# Patient Record
Sex: FEMALE | Race: WHITE
[De-identification: names, ages, dates, MRNs, and addresses within clinical notes are randomized per-mention and may not be internally consistent; named-entity substitution may affect disease eponyms.]

---

## 2018-05-16 ENCOUNTER — RESULT REVIEW (OUTPATIENT)
Age: 58
End: 2018-05-16

## 2019-03-27 PROBLEM — Z00.00 ENCOUNTER FOR PREVENTIVE HEALTH EXAMINATION: Status: ACTIVE | Noted: 2019-03-27

## 2019-04-08 ENCOUNTER — RESULT REVIEW (OUTPATIENT)
Age: 59
End: 2019-04-08

## 2019-04-16 ENCOUNTER — FORM ENCOUNTER (OUTPATIENT)
Age: 59
End: 2019-04-16

## 2019-04-17 ENCOUNTER — OUTPATIENT (OUTPATIENT)
Dept: OUTPATIENT SERVICES | Facility: HOSPITAL | Age: 59
LOS: 1 days | Discharge: HOME | End: 2019-04-17
Payer: COMMERCIAL

## 2019-04-17 ENCOUNTER — APPOINTMENT (OUTPATIENT)
Dept: UROLOGY | Facility: CLINIC | Age: 59
End: 2019-04-17
Payer: COMMERCIAL

## 2019-04-17 ENCOUNTER — OTHER (OUTPATIENT)
Age: 59
End: 2019-04-17

## 2019-04-17 VITALS
SYSTOLIC BLOOD PRESSURE: 141 MMHG | DIASTOLIC BLOOD PRESSURE: 86 MMHG | BODY MASS INDEX: 35.79 KG/M2 | HEART RATE: 86 BPM | WEIGHT: 202 LBS | HEIGHT: 63 IN

## 2019-04-17 DIAGNOSIS — Z78.9 OTHER SPECIFIED HEALTH STATUS: ICD-10-CM

## 2019-04-17 DIAGNOSIS — N20.0 CALCULUS OF KIDNEY: ICD-10-CM

## 2019-04-17 DIAGNOSIS — Z82.49 FAMILY HISTORY OF ISCHEMIC HEART DISEASE AND OTHER DISEASES OF THE CIRCULATORY SYSTEM: ICD-10-CM

## 2019-04-17 DIAGNOSIS — Z63.4 DISAPPEARANCE AND DEATH OF FAMILY MEMBER: ICD-10-CM

## 2019-04-17 DIAGNOSIS — F17.200 NICOTINE DEPENDENCE, UNSPECIFIED, UNCOMPLICATED: ICD-10-CM

## 2019-04-17 PROCEDURE — 74019 RADEX ABDOMEN 2 VIEWS: CPT | Mod: 26

## 2019-04-17 PROCEDURE — 99203 OFFICE O/P NEW LOW 30 MIN: CPT

## 2019-04-17 SDOH — SOCIAL STABILITY - SOCIAL INSECURITY: DISSAPEARANCE AND DEATH OF FAMILY MEMBER: Z63.4

## 2019-05-15 ENCOUNTER — APPOINTMENT (OUTPATIENT)
Dept: UROLOGY | Facility: CLINIC | Age: 59
End: 2019-05-15
Payer: COMMERCIAL

## 2019-05-15 VITALS
HEART RATE: 88 BPM | BODY MASS INDEX: 35.44 KG/M2 | SYSTOLIC BLOOD PRESSURE: 120 MMHG | WEIGHT: 200 LBS | HEIGHT: 63 IN | DIASTOLIC BLOOD PRESSURE: 74 MMHG

## 2019-05-15 PROCEDURE — 99215 OFFICE O/P EST HI 40 MIN: CPT

## 2019-05-15 NOTE — PHYSICAL EXAM
[General Appearance - Well Developed] : well developed [General Appearance - Well Nourished] : well nourished [Normal Appearance] : normal appearance [Well Groomed] : well groomed [General Appearance - In No Acute Distress] : no acute distress [Heart Rate And Rhythm] : Heart rate and rhythm were normal [Edema] : no peripheral edema [Respiration, Rhythm And Depth] : normal respiratory rhythm and effort [Exaggerated Use Of Accessory Muscles For Inspiration] : no accessory muscle use [Auscultation Breath Sounds / Voice Sounds] : lungs were clear to auscultation bilaterally [Bowel Sounds] : normal bowel sounds [Abdomen Soft] : soft [Abdomen Tenderness] : non-tender [Costovertebral Angle Tenderness] : no ~M costovertebral angle tenderness [Normal Station and Gait] : the gait and station were normal for the patient's age [] : no rash [No Focal Deficits] : no focal deficits [Oriented To Time, Place, And Person] : oriented to person, place, and time [Affect] : the affect was normal [Mood] : the mood was normal [Not Anxious] : not anxious [FreeTextEntry1] : Obese

## 2019-05-15 NOTE — HISTORY OF PRESENT ILLNESS
[None] : None [FreeTextEntry1] : Radha is a 50-year-old female, with a history of proteinuria, who was sent for consultation regarding bilateral renal stones discovered during the work up for her proteinuria. She has a remote history of renal stones, having passed a stone many years ago. She has never had any further episodes of renal colic and gross hematuria.\par \par She was sent for a renal sonogram, secondary to history of proteinuria, which revealed a questionable 1.1 cm nonobstructing right renal calculus. There were several small left renal cysts noted. CT scan was recommended. CT scan revealed an 8 mm nonobstructing calculus in the lower pole of the right kidney, Hounsfield units 800. There is a 3 mm nonobstructing calculus in the lower pole of the left kidney. There are some punctate calculus in the left kidney as well. No evidence of hydronephrosis or hydroureter bilaterally. There is some nonspecific diffuse thickening of the left adrenal gland without a discrete nodule, may reflect adrenal hyperplasia.\par

## 2019-05-15 NOTE — ASSESSMENT
[FreeTextEntry1] : She has numerous metabolic issues the main saving leonela is the volume but I think she’s going to need a nephrologist to fine-tune this.\par \par Please note she ready saw Dr. Suarez who initially told her to come back in six months. With this metabolic workup, a copy which will be given to her he may want to see her sooner and begin to make changes. in the meantime we will set her up for right lower pole shockwave lithotripsy realizing that even if we break it up the stone fragments may not pass completely because it is lower pole.\par \par In the end she will follow up with [facility] we prevention, schedule right lower pole shockwave lithotripsy. She is aware she has to be off all aspirin or aspirin like compounds for seven days prior to the procedure.\par

## 2019-05-15 NOTE — LETTER HEADER
[FreeTextEntry3] : Delmer Jennings M.D.\par Director of Urology\par [facility]\par [address] [place] [address], [address]\[place] [address]

## 2019-05-15 NOTE — HISTORY OF PRESENT ILLNESS
[FreeTextEntry1] : Radha had been seen in April 17 she had an eight or 9 mm right lower pole stones and if you punctate stones on the left side. We sent her for an x-ray to see if the stone is amenable to shockwave lithotripsy even though it is in the lower pole it is small enough that we might be able to take care of it and a metabolic workup to see why she’s forming the stones that she has formed many over time. She did the testing and comes in for review. Please note she will eventually need a nephrology consult as a the experts in prevention. [None] : no symptoms

## 2019-05-15 NOTE — LETTER BODY
[Dear  ___] : Dear  [unfilled], [Please see my note below.] : Please see my note below. [Consult Letter:] : I had the pleasure of evaluating your patient, [unfilled]. [Consult Closing:] : Thank you very much for allowing me to participate in the care of this patient.  If you have any questions, please do not hesitate to contact me. [Sincerely,] : Sincerely, [DrJudit  ___] : Dr. MO [FreeTextEntry2] : Dr. Anibal Peñaloza\par [address] [facility]\[place] [address]

## 2019-05-15 NOTE — ADDENDUM
[FreeTextEntry1] : DELMER JENNINGS M.D.\par [address] [place] [address], [address]\par [place], [place] [address]\par [phone]\par FAX#: [phone]\par \par May 15, 2019\par \par Dear ANALIPALMA	: 1960\par \par You have been scheduled for EXTRA CORPOREAL SHOCK WAVE LITHOTRIPSY of a RIGHT renal stone on Tuesday May / Yovana _2019.  If all things go as expected you will leave the hospital later that same day.  For the week prior to that period please do not take any aspirin or any blood thinning type drug.  If you are taking such drugs please tell us before you are admitted.  The procedure does not involve an incision, & you will be allowed to bathe or shower ad rahul. After the procedure please strain your urine (a strainer will be provided) and bring the fragments with you when I next see you (about two weeks after the procedure). Please ask my staff to arrange for a KUB on the day you are to see me.\par You will be given a prescription for pain medication.  If you wish to be able to fill it before you come into the hospital, please let us know so that we may give you the prescription in advance.\par If you have a heart murmur, please let us know so that we can give you an appropriate antibiotic to prevent any infection.  You should not eat or drink anything from midnight the night before, except for any heart or related medications you are taking.  If you are not sure if you should take the medication discuss that with the doctor in advance.\par If medical clearance is necessary please contact your primary care physician to arrange for that at least several days before your scheduled admission date.\par You will be called by the hospital several days prior to your admission in order to have pre-operative tests done which may include; CXR, EKG, URINE AND OR BLOODWORK.  If you have not been called in by 48 hours before your scheduled date of the procedure (______________) please call our office and let us know.\par \par 		If there are any questions, please feel free to discuss this with us at any time, you can reach us at the phone numbers listed above. \par \par Sincerely,\par \par Delmer Jennings MD\par \par Delmer Jennings MD\par Director of the Division of Urology, in the Department of Surgery\par [place], [place]\par \par PS: IF you have any ALLERGIES or any special requirements, such as special prescription blanks or you need the prescriptions in advance, please let me know.\par

## 2019-05-15 NOTE — ASSESSMENT
[FreeTextEntry1] : I reviewed the CAT scan with her. The right side has an 8 mm lower pole stone and should show on regular x-ray. The left side has a 3 mm lower pole and two other punctate ones in the upper region but there too small to buddy ends small enough that if they do form to the ureter they should pass. However they are big enough that they can continue to grow this will have to be watched.\par \par We’ll going to get a metabolic workup as I want her optimizes for his future stone formation and before we consider treating. If she is prone to stones and we break the 8 mm stones and to 4 to 10 3 to 4 mm stone she’ll end up with a lot of bigger stones. She is seen Dr. Suarez so will ask him to get involved from that aspect as well.\par

## 2019-05-15 NOTE — LETTER HEADER
[FreeTextEntry3] : Delmer Jennings M.D.\par Director of Urology\par [facility]\par [address] [place] [address], [address]\[place] [address]

## 2019-05-15 NOTE — PHYSICAL EXAM
[General Appearance - Well Developed] : well developed [Normal Appearance] : normal appearance [General Appearance - Well Nourished] : well nourished [Well Groomed] : well groomed [General Appearance - In No Acute Distress] : no acute distress [] : no respiratory distress [Respiration, Rhythm And Depth] : normal respiratory rhythm and effort [Exaggerated Use Of Accessory Muscles For Inspiration] : no accessory muscle use [Oriented To Time, Place, And Person] : oriented to person, place, and time [Affect] : the affect was normal [Mood] : the mood was normal [Not Anxious] : not anxious [Normal Station and Gait] : the gait and station were normal for the patient's age

## 2019-05-21 ENCOUNTER — FORM ENCOUNTER (OUTPATIENT)
Age: 59
End: 2019-05-21

## 2019-05-22 ENCOUNTER — OUTPATIENT (OUTPATIENT)
Dept: OUTPATIENT SERVICES | Facility: HOSPITAL | Age: 59
LOS: 1 days | Discharge: HOME | End: 2019-05-22
Payer: COMMERCIAL

## 2019-05-22 VITALS
HEIGHT: 63 IN | DIASTOLIC BLOOD PRESSURE: 85 MMHG | TEMPERATURE: 97 F | RESPIRATION RATE: 18 BRPM | SYSTOLIC BLOOD PRESSURE: 138 MMHG | WEIGHT: 199.96 LBS | OXYGEN SATURATION: 96 % | HEART RATE: 98 BPM

## 2019-05-22 DIAGNOSIS — Z98.890 OTHER SPECIFIED POSTPROCEDURAL STATES: Chronic | ICD-10-CM

## 2019-05-22 DIAGNOSIS — N20.2 CALCULUS OF KIDNEY WITH CALCULUS OF URETER: ICD-10-CM

## 2019-05-22 DIAGNOSIS — Z01.818 ENCOUNTER FOR OTHER PREPROCEDURAL EXAMINATION: ICD-10-CM

## 2019-05-22 LAB
ALBUMIN SERPL ELPH-MCNC: 4.7 G/DL — SIGNIFICANT CHANGE UP (ref 3.5–5.2)
ALP SERPL-CCNC: 100 U/L — SIGNIFICANT CHANGE UP (ref 30–115)
ALT FLD-CCNC: 27 U/L — SIGNIFICANT CHANGE UP (ref 0–41)
ANION GAP SERPL CALC-SCNC: 14 MMOL/L — SIGNIFICANT CHANGE UP (ref 7–14)
APPEARANCE UR: CLEAR — SIGNIFICANT CHANGE UP
APTT BLD: 33.1 SEC — SIGNIFICANT CHANGE UP (ref 27–39.2)
AST SERPL-CCNC: 22 U/L — SIGNIFICANT CHANGE UP (ref 0–41)
BACTERIA # UR AUTO: ABNORMAL /HPF
BASOPHILS # BLD AUTO: 0.06 K/UL — SIGNIFICANT CHANGE UP (ref 0–0.2)
BASOPHILS NFR BLD AUTO: 0.7 % — SIGNIFICANT CHANGE UP (ref 0–1)
BILIRUB SERPL-MCNC: 0.3 MG/DL — SIGNIFICANT CHANGE UP (ref 0.2–1.2)
BILIRUB UR-MCNC: NEGATIVE — SIGNIFICANT CHANGE UP
BUN SERPL-MCNC: 16 MG/DL — SIGNIFICANT CHANGE UP (ref 10–20)
CALCIUM SERPL-MCNC: 9.6 MG/DL — SIGNIFICANT CHANGE UP (ref 8.5–10.1)
CHLORIDE SERPL-SCNC: 107 MMOL/L — SIGNIFICANT CHANGE UP (ref 98–110)
CO2 SERPL-SCNC: 23 MMOL/L — SIGNIFICANT CHANGE UP (ref 17–32)
COLOR SPEC: YELLOW — SIGNIFICANT CHANGE UP
CREAT SERPL-MCNC: 1 MG/DL — SIGNIFICANT CHANGE UP (ref 0.7–1.5)
DIFF PNL FLD: NEGATIVE — SIGNIFICANT CHANGE UP
EOSINOPHIL # BLD AUTO: 0.19 K/UL — SIGNIFICANT CHANGE UP (ref 0–0.7)
EOSINOPHIL NFR BLD AUTO: 2.2 % — SIGNIFICANT CHANGE UP (ref 0–8)
GLUCOSE SERPL-MCNC: 97 MG/DL — SIGNIFICANT CHANGE UP (ref 70–99)
GLUCOSE UR QL: NEGATIVE MG/DL — SIGNIFICANT CHANGE UP
HCT VFR BLD CALC: 42 % — SIGNIFICANT CHANGE UP (ref 37–47)
HGB BLD-MCNC: 14.4 G/DL — SIGNIFICANT CHANGE UP (ref 12–16)
IMM GRANULOCYTES NFR BLD AUTO: 0.3 % — SIGNIFICANT CHANGE UP (ref 0.1–0.3)
INR BLD: 0.97 RATIO — SIGNIFICANT CHANGE UP (ref 0.65–1.3)
KETONES UR-MCNC: NEGATIVE — SIGNIFICANT CHANGE UP
LEUKOCYTE ESTERASE UR-ACNC: NEGATIVE — SIGNIFICANT CHANGE UP
LYMPHOCYTES # BLD AUTO: 1.86 K/UL — SIGNIFICANT CHANGE UP (ref 1.2–3.4)
LYMPHOCYTES # BLD AUTO: 21.3 % — SIGNIFICANT CHANGE UP (ref 20.5–51.1)
MCHC RBC-ENTMCNC: 28.3 PG — SIGNIFICANT CHANGE UP (ref 27–31)
MCHC RBC-ENTMCNC: 34.3 G/DL — SIGNIFICANT CHANGE UP (ref 32–37)
MCV RBC AUTO: 82.5 FL — SIGNIFICANT CHANGE UP (ref 81–99)
MONOCYTES # BLD AUTO: 0.76 K/UL — HIGH (ref 0.1–0.6)
MONOCYTES NFR BLD AUTO: 8.7 % — SIGNIFICANT CHANGE UP (ref 1.7–9.3)
NEUTROPHILS # BLD AUTO: 5.84 K/UL — SIGNIFICANT CHANGE UP (ref 1.4–6.5)
NEUTROPHILS NFR BLD AUTO: 66.8 % — SIGNIFICANT CHANGE UP (ref 42.2–75.2)
NITRITE UR-MCNC: NEGATIVE — SIGNIFICANT CHANGE UP
NRBC # BLD: 0 /100 WBCS — SIGNIFICANT CHANGE UP (ref 0–0)
PH UR: 6 — SIGNIFICANT CHANGE UP (ref 5–8)
PLATELET # BLD AUTO: 226 K/UL — SIGNIFICANT CHANGE UP (ref 130–400)
POTASSIUM SERPL-MCNC: 3.9 MMOL/L — SIGNIFICANT CHANGE UP (ref 3.5–5)
POTASSIUM SERPL-SCNC: 3.9 MMOL/L — SIGNIFICANT CHANGE UP (ref 3.5–5)
PROT SERPL-MCNC: 7.6 G/DL — SIGNIFICANT CHANGE UP (ref 6–8)
PROT UR-MCNC: 30 MG/DL
PROTHROM AB SERPL-ACNC: 11.2 SEC — SIGNIFICANT CHANGE UP (ref 9.95–12.87)
RBC # BLD: 5.09 M/UL — SIGNIFICANT CHANGE UP (ref 4.2–5.4)
RBC # FLD: 13.2 % — SIGNIFICANT CHANGE UP (ref 11.5–14.5)
SODIUM SERPL-SCNC: 144 MMOL/L — SIGNIFICANT CHANGE UP (ref 135–146)
SP GR SPEC: 1.01 — SIGNIFICANT CHANGE UP (ref 1.01–1.03)
TRI-PHOS CRY UR QL COMP ASSIST: ABNORMAL /HPF
UROBILINOGEN FLD QL: 0.2 MG/DL — SIGNIFICANT CHANGE UP (ref 0.2–0.2)
WBC # BLD: 8.74 K/UL — SIGNIFICANT CHANGE UP (ref 4.8–10.8)
WBC # FLD AUTO: 8.74 K/UL — SIGNIFICANT CHANGE UP (ref 4.8–10.8)
WBC UR QL: SIGNIFICANT CHANGE UP /HPF

## 2019-05-22 PROCEDURE — 93010 ELECTROCARDIOGRAM REPORT: CPT

## 2019-05-22 PROCEDURE — 71046 X-RAY EXAM CHEST 2 VIEWS: CPT | Mod: 26

## 2019-05-22 NOTE — H&P PST ADULT - NSICDXPASTSURGICALHX_GEN_ALL_CORE_FT
PAST SURGICAL HISTORY:  History of surgery abdominal lipomna removal    S/P colonoscopy with polypectomy

## 2019-05-23 LAB
CULTURE RESULTS: SIGNIFICANT CHANGE UP
SPECIMEN SOURCE: SIGNIFICANT CHANGE UP

## 2019-05-24 ENCOUNTER — APPOINTMENT (OUTPATIENT)
Dept: OBGYN | Facility: CLINIC | Age: 59
End: 2019-05-24

## 2019-06-04 ENCOUNTER — APPOINTMENT (OUTPATIENT)
Dept: UROLOGY | Facility: HOSPITAL | Age: 59
End: 2019-06-04
Payer: COMMERCIAL

## 2019-06-04 ENCOUNTER — OUTPATIENT (OUTPATIENT)
Dept: OUTPATIENT SERVICES | Facility: HOSPITAL | Age: 59
LOS: 1 days | Discharge: HOME | End: 2019-06-04

## 2019-06-04 VITALS
HEART RATE: 80 BPM | HEIGHT: 63 IN | DIASTOLIC BLOOD PRESSURE: 80 MMHG | SYSTOLIC BLOOD PRESSURE: 130 MMHG | RESPIRATION RATE: 18 BRPM | WEIGHT: 199.96 LBS | OXYGEN SATURATION: 97 % | TEMPERATURE: 97 F

## 2019-06-04 VITALS — RESPIRATION RATE: 18 BRPM | DIASTOLIC BLOOD PRESSURE: 66 MMHG | SYSTOLIC BLOOD PRESSURE: 121 MMHG | HEART RATE: 72 BPM

## 2019-06-04 DIAGNOSIS — Z98.890 OTHER SPECIFIED POSTPROCEDURAL STATES: Chronic | ICD-10-CM

## 2019-06-04 PROCEDURE — 50590 FRAGMENTING OF KIDNEY STONE: CPT | Mod: RT

## 2019-06-04 RX ORDER — SODIUM CHLORIDE 9 MG/ML
1000 INJECTION, SOLUTION INTRAVENOUS
Refills: 0 | Status: DISCONTINUED | OUTPATIENT
Start: 2019-06-04 | End: 2019-06-04

## 2019-06-04 RX ORDER — ONDANSETRON 8 MG/1
4 TABLET, FILM COATED ORAL ONCE
Refills: 0 | Status: DISCONTINUED | OUTPATIENT
Start: 2019-06-04 | End: 2019-06-04

## 2019-06-04 RX ORDER — HYDROMORPHONE HYDROCHLORIDE 2 MG/ML
0.5 INJECTION INTRAMUSCULAR; INTRAVENOUS; SUBCUTANEOUS ONCE
Refills: 0 | Status: DISCONTINUED | OUTPATIENT
Start: 2019-06-04 | End: 2019-06-04

## 2019-06-04 RX ORDER — OXYCODONE AND ACETAMINOPHEN 5; 325 MG/1; MG/1
1 TABLET ORAL ONCE
Refills: 0 | Status: DISCONTINUED | OUTPATIENT
Start: 2019-06-04 | End: 2019-06-04

## 2019-06-04 RX ADMIN — SODIUM CHLORIDE 100 MILLILITER(S): 9 INJECTION, SOLUTION INTRAVENOUS at 12:09

## 2019-06-04 NOTE — BRIEF OPERATIVE NOTE - NSICDXBRIEFPROCEDURE_GEN_ALL_CORE_FT
PROCEDURES:  Extracorporeal shockwave lithotripsy of kidney 04-Jun-2019 10:29:41 right Delmer Jennings

## 2019-06-04 NOTE — ASU DISCHARGE PLAN (ADULT/PEDIATRIC) - CALL YOUR DOCTOR IF YOU HAVE ANY OF THE FOLLOWING:
Inability to tolerate liquids or foods/Swelling that gets worse/Increased irritability or sluggishness/Numbness, tingling, color or temperature change to extremity/Bleeding that does not stop/Pain not relieved by Medications/Nausea and vomiting that does not stop/Unable to urinate/Fever greater than (need to indicate Fahrenheit or Celsius)

## 2019-06-04 NOTE — CHART NOTE - NSCHARTNOTEFT_GEN_A_CORE
PACU ANESTHESIA ADMISSION NOTE      Procedure: Extracorporeal shockwave lithotripsy of kidney: right    Post op diagnosis:  Right nephrolithiasis      ____  Intubated  TV:______       Rate: ______      FiO2: ______    _x___  Patent Airway    _x___  Full return of protective reflexes    __x__  Full recovery from anesthesia / back to baseline status    Vitals:  T(C): 36.2 (06-04-19 @ 09:06), Max: 36.2 (06-04-19 @ 08:06)  HR: 80 (06-04-19 @ 09:06) (80 - 80)  BP: 130/80 (06-04-19 @ 09:06) (130/80 - 130/80)  RR: 18 (06-04-19 @ 09:06) (18 - 18)  SpO2: 97% (06-04-19 @ 09:06) (97% - 97%)    Mental Status:  __x__ Awake   __x___ Alert   _____ Drowsy   _____ Sedated    Nausea/Vomiting:  ____ NO  ___x___Yes,   See Post - Op Orders          Pain Scale (0-10):  _____    Treatment: ____ None    _x___ See Post - Op/PCA Orders    Post - Operative Fluids:   ____ Oral   _x___ See Post - Op Orders    Plan: Discharge:   _x___Home       _____Floor     _____Critical Care    _____  Other:_________________    Comments: uneventful anesthesia course no complications. VItals stable. Pt transferred to PACU

## 2019-06-04 NOTE — ASU DISCHARGE PLAN (ADULT/PEDIATRIC) - PAIN MANAGEMENT
Prescriptions electronically submitted to pharmacy from doctor's office/tramadol Prescriptions electronically submitted to pharmacy from doctor's office/tramadol & flomax

## 2019-06-04 NOTE — BRIEF OPERATIVE NOTE - NSICDXBRIEFPREOP_GEN_ALL_CORE_FT
PRE-OP DIAGNOSIS:  Right kidney stone 04-Jun-2019 10:30:06  Dlemer Jennings PRE-OP DIAGNOSIS:  Right kidney stone 04-Jun-2019 10:30:06  Delmer Jennings

## 2019-06-04 NOTE — ASU DISCHARGE PLAN (ADULT/PEDIATRIC) - CARE PROVIDER_API CALL
Delmer Jennings)  Urology  [address] [place] [address], [address]  [place] [address]  Phone: [phone]  Fax: [phone]  Follow Up Time:

## 2019-06-04 NOTE — ASU PREOP CHECKLIST - TO WHOM
opportunity for questions and answersrendered Ariella SHELBY opportunity for questions and answersrendered

## 2019-06-07 DIAGNOSIS — Z87.442 PERSONAL HISTORY OF URINARY CALCULI: ICD-10-CM

## 2019-06-07 DIAGNOSIS — M77.9 ENTHESOPATHY, UNSPECIFIED: ICD-10-CM

## 2019-06-07 DIAGNOSIS — F17.200 NICOTINE DEPENDENCE, UNSPECIFIED, UNCOMPLICATED: ICD-10-CM

## 2019-06-07 DIAGNOSIS — N20.0 CALCULUS OF KIDNEY: ICD-10-CM

## 2019-06-07 PROBLEM — M77.30 CALCANEAL SPUR, UNSPECIFIED FOOT: Chronic | Status: ACTIVE | Noted: 2019-05-22

## 2019-06-07 PROBLEM — Z98.890 OTHER SPECIFIED POSTPROCEDURAL STATES: Chronic | Status: ACTIVE | Noted: 2019-05-22

## 2019-06-14 ENCOUNTER — FORM ENCOUNTER (OUTPATIENT)
Age: 59
End: 2019-06-14

## 2019-06-15 ENCOUNTER — OUTPATIENT (OUTPATIENT)
Dept: OUTPATIENT SERVICES | Facility: HOSPITAL | Age: 59
LOS: 1 days | Discharge: HOME | End: 2019-06-15
Payer: MEDICARE

## 2019-06-15 DIAGNOSIS — Z98.890 OTHER SPECIFIED POSTPROCEDURAL STATES: Chronic | ICD-10-CM

## 2019-06-15 DIAGNOSIS — N20.0 CALCULUS OF KIDNEY: ICD-10-CM

## 2019-06-15 PROCEDURE — 74019 RADEX ABDOMEN 2 VIEWS: CPT | Mod: 26

## 2019-06-19 ENCOUNTER — RESULT REVIEW (OUTPATIENT)
Age: 59
End: 2019-06-19

## 2019-07-01 ENCOUNTER — OUTPATIENT (OUTPATIENT)
Dept: OUTPATIENT SERVICES | Facility: HOSPITAL | Age: 59
LOS: 1 days | Discharge: HOME | End: 2019-07-01

## 2019-07-01 ENCOUNTER — APPOINTMENT (OUTPATIENT)
Dept: UROLOGY | Facility: CLINIC | Age: 59
End: 2019-07-01
Payer: COMMERCIAL

## 2019-07-01 VITALS
BODY MASS INDEX: 35.44 KG/M2 | DIASTOLIC BLOOD PRESSURE: 85 MMHG | HEIGHT: 63 IN | SYSTOLIC BLOOD PRESSURE: 120 MMHG | WEIGHT: 200 LBS | HEART RATE: 91 BPM

## 2019-07-01 DIAGNOSIS — Z98.890 OTHER SPECIFIED POSTPROCEDURAL STATES: Chronic | ICD-10-CM

## 2019-07-01 DIAGNOSIS — N20.0 CALCULUS OF KIDNEY: ICD-10-CM

## 2019-07-01 PROCEDURE — 99024 POSTOP FOLLOW-UP VISIT: CPT

## 2019-07-01 RX ORDER — TRAMADOL HYDROCHLORIDE 50 MG/1
50 TABLET, COATED ORAL 3 TIMES DAILY
Qty: 12 | Refills: 0 | Status: COMPLETED | COMMUNITY
Start: 2019-06-04 | End: 2019-07-01

## 2019-07-01 RX ORDER — IBUPROFEN 600 MG/1
600 TABLET, FILM COATED ORAL
Qty: 30 | Refills: 0 | Status: DISCONTINUED | COMMUNITY
Start: 2019-05-11

## 2019-07-01 RX ORDER — TAMSULOSIN HYDROCHLORIDE 0.4 MG/1
0.4 CAPSULE ORAL
Qty: 30 | Refills: 0 | Status: COMPLETED | COMMUNITY
Start: 2019-06-04 | End: 2019-07-01

## 2019-07-01 RX ORDER — POLYETHYLENE GLYCOL-3350 AND ELECTROLYTES 236; 6.74; 5.86; 2.97; 22.74 G/274.31G; G/274.31G; G/274.31G; G/274.31G; G/274.31G
236 POWDER, FOR SOLUTION ORAL
Qty: 4000 | Refills: 0 | Status: DISCONTINUED | COMMUNITY
Start: 2019-04-12

## 2019-07-01 RX ORDER — OMEGA-3-ACID ETHYL ESTERS CAPSULES 1 G/1
1 CAPSULE, LIQUID FILLED ORAL
Qty: 120 | Refills: 0 | Status: DISCONTINUED | COMMUNITY
Start: 2019-05-11

## 2019-07-01 RX ORDER — FACIAL-BODY WIPES
5 EACH TOPICAL
Qty: 4 | Refills: 0 | Status: DISCONTINUED | COMMUNITY
Start: 2019-04-12

## 2019-07-01 NOTE — LETTER BODY
[Dear  ___] : Dear  [unfilled], [Courtesy Letter:] : I had the pleasure of seeing your patient, [unfilled], in my office today. [Please see my note below.] : Please see my note below. [Sincerely,] : Sincerely, [DrJudit  ___] : Dr. MO [FreeTextEntry2] : Dr. Anibal Peñaloza\par [address] [facility]\[place] [address]

## 2019-07-01 NOTE — HISTORY OF PRESENT ILLNESS
[None] : None [FreeTextEntry1] : Radha is a 50-year-old female who we've been following for nephrolithiasis. She status post ESWL of a 9 mm right lower pole stone. There is punctate stone on her left side. She is currently follow up with nephrology and will continue to do so regarding metabolic workup. She presents to review her KUB x-ray post procedure. She has passed multiple fragments and these will be sent for analysis. Statement Selected

## 2019-07-01 NOTE — ASSESSMENT
[FreeTextEntry1] : KUB x-ray reveals resolution of 9 mm right renal stone. Patients stone will be sent for analysis. She continue follow up with nephrology regarding metabolic workup and stone prevention.\par \par She is aware that the KUB did not show the tiny stone on the left and she could has small fragments on the right that will be seen. Hopefully the ultrasound will be more definitive. She also knows she needs to follow up with nephrology in the hopes of preventing further stones\par \par She will follow up in September with renal sonogram/KUB x-ray for review.

## 2019-07-08 LAB — COMPN STONE: NORMAL

## 2019-07-16 ENCOUNTER — RESULT REVIEW (OUTPATIENT)
Age: 59
End: 2019-07-16

## 2019-09-27 ENCOUNTER — FORM ENCOUNTER (OUTPATIENT)
Age: 59
End: 2019-09-27

## 2019-09-28 ENCOUNTER — OUTPATIENT (OUTPATIENT)
Dept: OUTPATIENT SERVICES | Facility: HOSPITAL | Age: 59
LOS: 1 days | Discharge: HOME | End: 2019-09-28
Payer: MEDICARE

## 2019-09-28 DIAGNOSIS — Z98.890 OTHER SPECIFIED POSTPROCEDURAL STATES: Chronic | ICD-10-CM

## 2019-09-28 DIAGNOSIS — N20.0 CALCULUS OF KIDNEY: ICD-10-CM

## 2019-09-28 PROCEDURE — 74019 RADEX ABDOMEN 2 VIEWS: CPT | Mod: 26

## 2019-10-02 ENCOUNTER — APPOINTMENT (OUTPATIENT)
Dept: UROLOGY | Facility: CLINIC | Age: 59
End: 2019-10-02
Payer: COMMERCIAL

## 2019-10-02 VITALS
HEIGHT: 63 IN | HEART RATE: 83 BPM | BODY MASS INDEX: 35.44 KG/M2 | WEIGHT: 200 LBS | DIASTOLIC BLOOD PRESSURE: 78 MMHG | SYSTOLIC BLOOD PRESSURE: 119 MMHG

## 2019-10-02 PROCEDURE — 99213 OFFICE O/P EST LOW 20 MIN: CPT

## 2019-10-02 NOTE — LETTER BODY
[Dear  ___] : Dear ~TAYA, [Courtesy Letter:] : I had the pleasure of seeing your patient, [unfilled], in my office today. [Sincerely,] : Sincerely, [Please see my note below.] : Please see my note below. [DrJudit  ___] : Dr. MO [FreeTextEntry2] : Philip Cox NP\par [address] \[place] [address] \par

## 2019-10-02 NOTE — LETTER HEADER
[FreeTextEntry3] : Delmer Jennings M.D.\par Director of Urology\par [facility]\par [address] [place] [address], [address]\[place] [address]

## 2019-10-02 NOTE — HISTORY OF PRESENT ILLNESS
[None] : no symptoms [FreeTextEntry1] : Radha is a 58-year-old female who underwent right Shockwave lithotripsy June 4 for a  lower pole stone. Follow-up studies have shown a persistent small stone on the left lower pole and a small remnant in the right lower pole. She had a recent x-ray done on September 28, 2019 and is here for review. Other than occasional twinge in her right groin which is probably not related to the kidney stones she is feeling fine. She is supposed to see Dr. Suarez soon but he was on vacation and her appointment was rescheduled

## 2019-10-02 NOTE — PHYSICAL EXAM
[General Appearance - Well Developed] : well developed [General Appearance - Well Nourished] : well nourished [Normal Appearance] : normal appearance [Well Groomed] : well groomed [General Appearance - In No Acute Distress] : no acute distress [Abdomen Soft] : soft [Abdomen Tenderness] : non-tender [Costovertebral Angle Tenderness] : no ~M costovertebral angle tenderness [Edema] : no peripheral edema [Respiration, Rhythm And Depth] : normal respiratory rhythm and effort [] : no respiratory distress [Oriented To Time, Place, And Person] : oriented to person, place, and time [Exaggerated Use Of Accessory Muscles For Inspiration] : no accessory muscle use [Mood] : the mood was normal [Affect] : the affect was normal [Not Anxious] : not anxious [Normal Station and Gait] : the gait and station were normal for the patient's age [FreeTextEntry1] : morbid obesity

## 2019-10-02 NOTE — ASSESSMENT
[FreeTextEntry1] : Physical exam other than her weight shows no major change there is no CVA no abdominal tenderness. I reviewed the x-rays with her and to my review the lower pole stones having changed and I don’t see anything close to 7 mm on the left lower pole and I will submitted to senior staff for review.\par \par From my viewpoint she is stable she will follow with Dr. Suarez she will get an ultrasound in six months and see me then.\par

## 2019-12-28 ENCOUNTER — OUTPATIENT (OUTPATIENT)
Dept: OUTPATIENT SERVICES | Facility: HOSPITAL | Age: 59
LOS: 1 days | Discharge: HOME | End: 2019-12-28

## 2019-12-28 DIAGNOSIS — Z98.890 OTHER SPECIFIED POSTPROCEDURAL STATES: Chronic | ICD-10-CM

## 2019-12-28 DIAGNOSIS — E78.6 LIPOPROTEIN DEFICIENCY: ICD-10-CM

## 2019-12-28 DIAGNOSIS — F41.9 ANXIETY DISORDER, UNSPECIFIED: ICD-10-CM

## 2019-12-28 DIAGNOSIS — E78.5 HYPERLIPIDEMIA, UNSPECIFIED: ICD-10-CM

## 2019-12-28 DIAGNOSIS — N20.0 CALCULUS OF KIDNEY: ICD-10-CM

## 2019-12-28 DIAGNOSIS — E83.52 HYPERCALCEMIA: ICD-10-CM

## 2019-12-28 DIAGNOSIS — R79.89 OTHER SPECIFIED ABNORMAL FINDINGS OF BLOOD CHEMISTRY: ICD-10-CM

## 2019-12-28 DIAGNOSIS — R80.9 PROTEINURIA, UNSPECIFIED: ICD-10-CM

## 2019-12-28 DIAGNOSIS — E66.9 OBESITY, UNSPECIFIED: ICD-10-CM

## 2019-12-28 DIAGNOSIS — E55.9 VITAMIN D DEFICIENCY, UNSPECIFIED: ICD-10-CM

## 2019-12-28 DIAGNOSIS — F32.9 MAJOR DEPRESSIVE DISORDER, SINGLE EPISODE, UNSPECIFIED: ICD-10-CM

## 2019-12-28 DIAGNOSIS — N60.09 SOLITARY CYST OF UNSPECIFIED BREAST: ICD-10-CM

## 2019-12-28 DIAGNOSIS — E21.0 PRIMARY HYPERPARATHYROIDISM: ICD-10-CM

## 2020-03-03 ENCOUNTER — RESULT REVIEW (OUTPATIENT)
Age: 60
End: 2020-03-03

## 2020-03-06 NOTE — PRE-ANESTHESIA EVALUATION ADULT - WEIGHT IN KG
Dr. Afshan Soliman had a long talk with patient and  regarding how to progress with care. They are considering hospice, but not ready at this point. Fentanyl patch increased and dilaudid dose increased. Pt up to chair for several hours today. 90.7

## 2020-03-21 ENCOUNTER — OUTPATIENT (OUTPATIENT)
Dept: OUTPATIENT SERVICES | Facility: HOSPITAL | Age: 60
LOS: 1 days | Discharge: HOME | End: 2020-03-21
Payer: MEDICARE

## 2020-03-21 ENCOUNTER — RESULT REVIEW (OUTPATIENT)
Age: 60
End: 2020-03-21

## 2020-03-21 DIAGNOSIS — Z98.890 OTHER SPECIFIED POSTPROCEDURAL STATES: Chronic | ICD-10-CM

## 2020-03-21 DIAGNOSIS — N20.0 CALCULUS OF KIDNEY: ICD-10-CM

## 2020-03-21 PROCEDURE — 76775 US EXAM ABDO BACK WALL LIM: CPT | Mod: 26

## 2020-06-04 ENCOUNTER — APPOINTMENT (OUTPATIENT)
Dept: UROLOGY | Facility: CLINIC | Age: 60
End: 2020-06-04
Payer: COMMERCIAL

## 2020-06-04 VITALS
BODY MASS INDEX: 35.44 KG/M2 | TEMPERATURE: 97.8 F | SYSTOLIC BLOOD PRESSURE: 126 MMHG | DIASTOLIC BLOOD PRESSURE: 80 MMHG | HEART RATE: 83 BPM | HEIGHT: 63 IN | WEIGHT: 200 LBS

## 2020-06-04 DIAGNOSIS — N20.0 CALCULUS OF KIDNEY: ICD-10-CM

## 2020-06-04 PROCEDURE — 99214 OFFICE O/P EST MOD 30 MIN: CPT

## 2020-06-04 NOTE — LETTER BODY
[Dear  ___] : Dear ~TAYA, [Please see my note below.] : Please see my note below. [Courtesy Letter:] : I had the pleasure of seeing your patient, [unfilled], in my office today. [Sincerely,] : Sincerely, [DrJudit  ___] : Dr. MO [FreeTextEntry2] : Philip Cox NP\par [address]\[place] [address]

## 2020-06-04 NOTE — ASSESSMENT
[FreeTextEntry1] : The physical exam shows no CVA or abdominal tenderness. She has lost some weight but is still far from thin\par I reviewed with her that it’s not just what you eat but the combination for example if you going to have some cheese and going to have some broccoli you’re better off eating the cheese and the broccoli so the calcium the oxalate bind in the guard and then go out. As well spinach is very high in oxalate but broccoli is not a oxalate favorable food either. She needs to see nutritionist as sometimes when you avoid one food and switch to another is really not as helpful as you like and she needs guidance because she has multiple medical issues and eating one thing can affect one aspect of your health but it could adversely affect a different one. E.G. stone diet advice says if you eat oxalate it should be with cheese but she has cholesterol issues .\par \par From a urologic aspect the stone is too small to treat and though it could fall out and bother her that risk is something that she will have to wait and watch. Hopefully she maintains her diet and hydration it will not be a problem. She will see the nephrologist, she will try get to the nutritionist and in today’s pandemic age the nutritionist that was [place] bridge may actually be doing telemedicine now and she could do it from home.\par \par

## 2020-06-04 NOTE — LETTER HEADER
[FreeTextEntry3] : Delmer Jennings M.D.\par Director of Urology\par [facility]\par [address] [place] [address], [address]\[place] [address]

## 2020-06-04 NOTE — HISTORY OF PRESENT ILLNESS
[FreeTextEntry1] : Rosey is a 59-year-old female who on June 4, 2019 underwent shockwave lithotripsy ever right renal stone. Follow-up studies had a question of some residual right-sided stone and she had a known left lower pull stone that was about 3 mm with an adjacent 1 mm stone wall the KUB was red is showing it was now 7 mm i.e. felt it was an overreach. We arranged for her to get an ultrasound in March he was supposed to come back in follow-up to review in the pandemic kit. She is now coming in for review\par she tells me that since she was last seen she’s followed up with Dr. Suarez that she has not been able to see him for a while either. We had discuss getting a nutritionist are. The one that she was recommended was in [place] and she did not want to go over the bridge. She stopped eating spinach but switched to broccoli, she’s avoiding cheese because of her cholesterol and know she needs to hydrate. She said no symptoms since last seen. \par  [None] : no symptoms

## 2020-06-15 ENCOUNTER — RESULT REVIEW (OUTPATIENT)
Age: 60
End: 2020-06-15

## 2020-09-23 ENCOUNTER — OUTPATIENT (OUTPATIENT)
Dept: OUTPATIENT SERVICES | Facility: HOSPITAL | Age: 60
LOS: 1 days | Discharge: HOME | End: 2020-09-23
Payer: MEDICARE

## 2020-09-23 ENCOUNTER — APPOINTMENT (OUTPATIENT)
Dept: UROLOGY | Facility: CLINIC | Age: 60
End: 2020-09-23

## 2020-09-23 DIAGNOSIS — Z98.890 OTHER SPECIFIED POSTPROCEDURAL STATES: Chronic | ICD-10-CM

## 2020-09-23 DIAGNOSIS — N20.0 CALCULUS OF KIDNEY: ICD-10-CM

## 2020-09-23 PROCEDURE — 76775 US EXAM ABDO BACK WALL LIM: CPT | Mod: 26

## 2020-09-23 PROCEDURE — 74018 RADEX ABDOMEN 1 VIEW: CPT | Mod: 26

## 2020-09-24 ENCOUNTER — APPOINTMENT (OUTPATIENT)
Dept: UROLOGY | Facility: CLINIC | Age: 60
End: 2020-09-24
Payer: COMMERCIAL

## 2020-09-24 VITALS
HEART RATE: 77 BPM | TEMPERATURE: 98.2 F | HEIGHT: 63 IN | WEIGHT: 176 LBS | SYSTOLIC BLOOD PRESSURE: 128 MMHG | BODY MASS INDEX: 31.18 KG/M2 | DIASTOLIC BLOOD PRESSURE: 77 MMHG

## 2020-09-24 PROCEDURE — 99214 OFFICE O/P EST MOD 30 MIN: CPT

## 2020-09-26 NOTE — LETTER HEADER
[FreeTextEntry3] : Delmer Jennings M.D.\par Director of Urology\par [facility]\par [address] [place] [address], [address]\[place] [address]

## 2020-09-26 NOTE — HISTORY OF PRESENT ILLNESS
[None] : no symptoms [FreeTextEntry1] : Radha is a 59-year-old female who we have been following for renal stones. She status post right ESWL in 2019. She has some small residual stones. She has been seen by nephrology, who recommended potassium citrate and dietary changes. She did not take the potassium citrate. She presents today to review her recent renal sonogram and KUB x-ray. She denies any stone passage since we last saw her.\par

## 2020-09-26 NOTE — LETTER BODY
[Dear  ___] : Dear ~TAYA, [Courtesy Letter:] : I had the pleasure of seeing your patient, [unfilled], in my office today. [Please see my note below.] : Please see my note below. [Sincerely,] : Sincerely, [DrJudit  ___] : Dr. MO [FreeTextEntry2] : Philip Cox NP\par [address]\[place] [address]

## 2020-09-26 NOTE — ASSESSMENT
[FreeTextEntry1] : We discussed her imaging and she has 2 stones in the left kidney. We reviewed the options and she elected for observation for now. We discussed proper stone prevention and after a lengthy discussion she agreed to start potassium citrate 10 mEq 2 tablets t.i.d. titrating to a pH of 6.5. Reviewed how to titrate properly and will provide her with wallpapering as well as a place to obtain pH strips. She understands that the current stones will not be dissolved.\par \par She will follow up in 3 months with BMP and repeat renal sonogram/KUB x-ray. We asked her to take laxatives the night before KUB x-ray for better imaging quality. If the stones are growing we might consider treatment.

## 2020-10-22 NOTE — ASU PATIENT PROFILE, ADULT - CENTRAL VENOUS CATHETER
No Vaccines Administered.
Airway patent, Nasal mucosa clear. Mouth with normal mucosa. Throat has no vesicles, no oropharyngeal exudates and uvula is midline.
no

## 2020-11-11 LAB
ANION GAP SERPL CALC-SCNC: 12 MMOL/L
BUN SERPL-MCNC: 12 MG/DL
CALCIUM SERPL-MCNC: 9.8 MG/DL
CHLORIDE SERPL-SCNC: 104 MMOL/L
CO2 SERPL-SCNC: 23 MMOL/L
CREAT SERPL-MCNC: 0.8 MG/DL
GLUCOSE SERPL-MCNC: 103 MG/DL
POTASSIUM SERPL-SCNC: 4.4 MMOL/L
SODIUM SERPL-SCNC: 139 MMOL/L

## 2020-11-20 ENCOUNTER — OUTPATIENT (OUTPATIENT)
Dept: OUTPATIENT SERVICES | Facility: HOSPITAL | Age: 60
LOS: 1 days | Discharge: HOME | End: 2020-11-20
Payer: MEDICARE

## 2020-11-20 DIAGNOSIS — N20.0 CALCULUS OF KIDNEY: ICD-10-CM

## 2020-11-20 DIAGNOSIS — Z98.890 OTHER SPECIFIED POSTPROCEDURAL STATES: Chronic | ICD-10-CM

## 2020-11-20 PROCEDURE — 74019 RADEX ABDOMEN 2 VIEWS: CPT | Mod: 26

## 2020-11-20 PROCEDURE — 76775 US EXAM ABDO BACK WALL LIM: CPT | Mod: 26

## 2020-12-07 ENCOUNTER — APPOINTMENT (OUTPATIENT)
Dept: UROLOGY | Facility: CLINIC | Age: 60
End: 2020-12-07
Payer: COMMERCIAL

## 2020-12-07 VITALS
TEMPERATURE: 98.3 F | HEIGHT: 63 IN | WEIGHT: 180 LBS | HEART RATE: 78 BPM | SYSTOLIC BLOOD PRESSURE: 136 MMHG | BODY MASS INDEX: 31.89 KG/M2 | DIASTOLIC BLOOD PRESSURE: 80 MMHG

## 2020-12-07 DIAGNOSIS — N20.0 CALCULUS OF KIDNEY: ICD-10-CM

## 2020-12-07 PROCEDURE — 99213 OFFICE O/P EST LOW 20 MIN: CPT

## 2020-12-07 PROCEDURE — 99072 ADDL SUPL MATRL&STAF TM PHE: CPT

## 2020-12-07 RX ORDER — AMOXICILLIN AND CLAVULANATE POTASSIUM 875; 125 MG/1; MG/1
875-125 TABLET, COATED ORAL
Qty: 20 | Refills: 0 | Status: DISCONTINUED | COMMUNITY
Start: 2020-07-02

## 2020-12-07 RX ORDER — ATORVASTATIN CALCIUM 20 MG/1
20 TABLET, FILM COATED ORAL
Qty: 30 | Refills: 0 | Status: ACTIVE | COMMUNITY
Start: 2020-05-21

## 2020-12-13 NOTE — ASSESSMENT
[FreeTextEntry1] : Her x-ray and sonogram show small but stable stones. The x-ray reportedly did not show the 4 mm stone however, I think I can see it faintly.\par \par All options were reviewed including ESWL, lithotripsy, and observation. At this time she elected for observation. She'll continue potassium citrate & followup and 4 months with repeat KUB and BMP

## 2020-12-13 NOTE — HISTORY OF PRESENT ILLNESS
[None] : no symptoms [FreeTextEntry1] : Radha is a 59-year-old female who we have been following for renal stones. She status post right ESWL in 2019, with small residual stones. At her last visit we placed her on potassium citrate 10 mEq 2 tablets 3 times daily, titrated to a urine ph between 6.5.\par \par She states she has been doing well taking the medicine and titrating her dose. She presents today to review her BMP and renal ultrasound and KUB.\par

## 2020-12-13 NOTE — LETTER BODY
[Dear  ___] : Dear ~TAYA, [Courtesy Letter:] : I had the pleasure of seeing your patient, [unfilled], in my office today. [Please see my note below.] : Please see my note below. [Sincerely,] : Sincerely, [DrJudit  ___] : Dr. MO [FreeTextEntry2] : Philip Cox NP\par [address]\[place] [address]

## 2020-12-13 NOTE — LETTER HEADER
[FreeTextEntry3] : Delmer Jennings M.D.\par Director of Urology\par [facility]\par [address] [place] [address], [address]\[place] [address]

## 2021-01-24 ENCOUNTER — RX RENEWAL (OUTPATIENT)
Age: 61
End: 2021-01-24

## 2021-01-24 RX ORDER — POTASSIUM CITRATE 10 MEQ/1
10 MEQ TABLET, EXTENDED RELEASE ORAL
Qty: 180 | Refills: 3 | Status: ACTIVE | COMMUNITY
Start: 2020-09-24 | End: 1900-01-01

## 2021-04-08 ENCOUNTER — APPOINTMENT (OUTPATIENT)
Dept: UROLOGY | Facility: CLINIC | Age: 61
End: 2021-04-08

## 2021-06-23 ENCOUNTER — RESULT REVIEW (OUTPATIENT)
Age: 61
End: 2021-06-23

## 2021-06-30 ENCOUNTER — RESULT REVIEW (OUTPATIENT)
Age: 61
End: 2021-06-30

## 2021-11-11 ENCOUNTER — RESULT REVIEW (OUTPATIENT)
Age: 61
End: 2021-11-11

## 2021-12-09 ENCOUNTER — OUTPATIENT (OUTPATIENT)
Dept: OUTPATIENT SERVICES | Facility: HOSPITAL | Age: 61
LOS: 1 days | Discharge: HOME | End: 2021-12-09
Payer: MEDICARE

## 2021-12-09 ENCOUNTER — RESULT REVIEW (OUTPATIENT)
Age: 61
End: 2021-12-09

## 2021-12-09 DIAGNOSIS — Z98.890 OTHER SPECIFIED POSTPROCEDURAL STATES: Chronic | ICD-10-CM

## 2021-12-09 DIAGNOSIS — E87.5 HYPERKALEMIA: ICD-10-CM

## 2021-12-09 DIAGNOSIS — N20.0 CALCULUS OF KIDNEY: ICD-10-CM

## 2021-12-09 PROCEDURE — 74018 RADEX ABDOMEN 1 VIEW: CPT | Mod: 26

## 2021-12-09 PROCEDURE — 76775 US EXAM ABDO BACK WALL LIM: CPT | Mod: 26

## 2021-12-10 ENCOUNTER — NON-APPOINTMENT (OUTPATIENT)
Age: 61
End: 2021-12-10

## 2021-12-10 LAB
ANION GAP SERPL CALC-SCNC: 15 MMOL/L
BUN SERPL-MCNC: 14 MG/DL
CALCIUM SERPL-MCNC: 10.1 MG/DL
CHLORIDE SERPL-SCNC: 106 MMOL/L
CO2 SERPL-SCNC: 20 MMOL/L
CREAT SERPL-MCNC: 0.9 MG/DL
GLUCOSE SERPL-MCNC: 109 MG/DL
POTASSIUM SERPL-SCNC: 5.2 MMOL/L
SODIUM SERPL-SCNC: 141 MMOL/L

## 2021-12-15 LAB
ANION GAP SERPL CALC-SCNC: 9 MMOL/L
BUN SERPL-MCNC: 14 MG/DL
CALCIUM SERPL-MCNC: 9.4 MG/DL
CHLORIDE SERPL-SCNC: 107 MMOL/L
CO2 SERPL-SCNC: 25 MMOL/L
CREAT SERPL-MCNC: 1 MG/DL
GLUCOSE SERPL-MCNC: 108 MG/DL
POTASSIUM SERPL-SCNC: 4.9 MMOL/L
SODIUM SERPL-SCNC: 141 MMOL/L

## 2022-01-03 ENCOUNTER — APPOINTMENT (OUTPATIENT)
Dept: UROLOGY | Facility: CLINIC | Age: 62
End: 2022-01-03
Payer: COMMERCIAL

## 2022-01-03 DIAGNOSIS — N20.0 CALCULUS OF KIDNEY: ICD-10-CM

## 2022-01-03 PROCEDURE — 99214 OFFICE O/P EST MOD 30 MIN: CPT | Mod: 95

## 2022-01-03 NOTE — LETTER BODY
[Dear  ___] : Dear ~TAYA, [Courtesy Letter:] : I had the pleasure of seeing your patient, [unfilled], in my office today. [Please see my note below.] : Please see my note below. [Sincerely,] : Sincerely, [DrJudit  ___] : Dr. MO [FreeTextEntry2] : Philip Cox NP\par [address]\[place] [address]

## 2022-01-03 NOTE — HISTORY OF PRESENT ILLNESS
[Home] : at home, [unfilled] , at the time of the visit. [Medical Office: ([facility])___] : at the medical office located in  [FreeTextEntry3] : she has received, reviewed and agreed to the telemedicine consent  [FreeTextEntry1] : Radha is a 61-year-old female last seen December 7, 2020 with a history of bilateral stones last seen December 2020 with radiologic studies at that time showing small but stable left-sided stones.  We have discussed treatment with shockwave lithotripsy observation with and without compliant with medication.  She chose to continue with potassium citrate not have any procedures and get a follow-up study in 4 months.  She is showing up now over a year later tells me that since last seen  she has had other medical issues, including thyroid issues.  Blood test which we got to help monitor her potassium was 5.2 so we repeated it.  Please note she is no longer going to a nephrologist she is seeing her PCP on a regular basis and seeing me once a year.  She knows that the primary care doctor should be monitoring her blood test because of the potassium [None] : no symptoms

## 2022-01-03 NOTE — PHYSICAL EXAM
[General Appearance - Well Developed] : well developed [General Appearance - Well Nourished] : well nourished [Normal Appearance] : normal appearance [Well Groomed] : well groomed [General Appearance - In No Acute Distress] : no acute distress [] : no respiratory distress [Respiration, Rhythm And Depth] : normal respiratory rhythm and effort [Exaggerated Use Of Accessory Muscles For Inspiration] : no accessory muscle use [Oriented To Time, Place, And Person] : oriented to person, place, and time [Affect] : the affect was normal [Mood] : the mood was normal [FreeTextEntry1] : Slightly anxious and stressed which is part of the course given the current Covid restrictions

## 2022-01-03 NOTE — ASSESSMENT
[FreeTextEntry1] : She tells me that she has been doing well being compliant with the potassium citrate and feels okay urologically.  She has an issue of a nodule in her thyroid which is nonfunctioning and she had questions of that but I told her to speak to the ENT or endocrinologist that is not my area of expertise.  From urologic aspect, the KUB and ultrasound do not really show anything worthwhile to treat and I think as long as she maintains stability if she is following up with the PCP to check her potassium and he and or she are willing to manage her she can stay with them, i she prefers that we stay involved.  Therefore, I will see her again in a year with an ultrasound before she comes back in

## 2022-01-03 NOTE — LETTER HEADER
[FreeTextEntry3] : Delmer Jennings M.D.\par Director Emeritus of Urology\par [facility]\par [address] [place] [address], [address]\[place] [address]

## 2022-01-17 ENCOUNTER — TRANSCRIPTION ENCOUNTER (OUTPATIENT)
Age: 62
End: 2022-01-17

## 2023-02-08 ENCOUNTER — EMERGENCY (EMERGENCY)
Facility: HOSPITAL | Age: 63
LOS: 0 days | Discharge: HOME | End: 2023-02-08
Attending: EMERGENCY MEDICINE
Payer: COMMERCIAL

## 2023-02-08 VITALS
DIASTOLIC BLOOD PRESSURE: 75 MMHG | SYSTOLIC BLOOD PRESSURE: 137 MMHG | RESPIRATION RATE: 18 BRPM | HEART RATE: 72 BPM | OXYGEN SATURATION: 98 %

## 2023-02-08 VITALS
HEIGHT: 63 IN | HEART RATE: 89 BPM | WEIGHT: 201.94 LBS | TEMPERATURE: 98 F | DIASTOLIC BLOOD PRESSURE: 90 MMHG | SYSTOLIC BLOOD PRESSURE: 136 MMHG | RESPIRATION RATE: 20 BRPM

## 2023-02-08 DIAGNOSIS — R06.02 SHORTNESS OF BREATH: ICD-10-CM

## 2023-02-08 DIAGNOSIS — Z86.018 PERSONAL HISTORY OF OTHER BENIGN NEOPLASM: ICD-10-CM

## 2023-02-08 DIAGNOSIS — R00.2 PALPITATIONS: ICD-10-CM

## 2023-02-08 DIAGNOSIS — R07.89 OTHER CHEST PAIN: ICD-10-CM

## 2023-02-08 DIAGNOSIS — Z98.890 OTHER SPECIFIED POSTPROCEDURAL STATES: Chronic | ICD-10-CM

## 2023-02-08 DIAGNOSIS — Z86.010 PERSONAL HISTORY OF COLONIC POLYPS: ICD-10-CM

## 2023-02-08 DIAGNOSIS — Z87.442 PERSONAL HISTORY OF URINARY CALCULI: ICD-10-CM

## 2023-02-08 LAB
ALBUMIN SERPL ELPH-MCNC: 4.4 G/DL — SIGNIFICANT CHANGE UP (ref 3.5–5.2)
ALP SERPL-CCNC: 75 U/L — SIGNIFICANT CHANGE UP (ref 30–115)
ALT FLD-CCNC: 26 U/L — SIGNIFICANT CHANGE UP (ref 0–41)
ANION GAP SERPL CALC-SCNC: 9 MMOL/L — SIGNIFICANT CHANGE UP (ref 7–14)
AST SERPL-CCNC: 24 U/L — SIGNIFICANT CHANGE UP (ref 0–41)
BASOPHILS # BLD AUTO: 0.05 K/UL — SIGNIFICANT CHANGE UP (ref 0–0.2)
BASOPHILS NFR BLD AUTO: 0.9 % — SIGNIFICANT CHANGE UP (ref 0–1)
BILIRUB SERPL-MCNC: 0.4 MG/DL — SIGNIFICANT CHANGE UP (ref 0.2–1.2)
BUN SERPL-MCNC: 15 MG/DL — SIGNIFICANT CHANGE UP (ref 10–20)
CALCIUM SERPL-MCNC: 9.3 MG/DL — SIGNIFICANT CHANGE UP (ref 8.4–10.5)
CHLORIDE SERPL-SCNC: 110 MMOL/L — SIGNIFICANT CHANGE UP (ref 98–110)
CO2 SERPL-SCNC: 23 MMOL/L — SIGNIFICANT CHANGE UP (ref 17–32)
CREAT SERPL-MCNC: 0.8 MG/DL — SIGNIFICANT CHANGE UP (ref 0.7–1.5)
EGFR: 83 ML/MIN/1.73M2 — SIGNIFICANT CHANGE UP
EOSINOPHIL # BLD AUTO: 0.17 K/UL — SIGNIFICANT CHANGE UP (ref 0–0.7)
EOSINOPHIL NFR BLD AUTO: 3.2 % — SIGNIFICANT CHANGE UP (ref 0–8)
GLUCOSE SERPL-MCNC: 104 MG/DL — HIGH (ref 70–99)
HCT VFR BLD CALC: 39.4 % — SIGNIFICANT CHANGE UP (ref 37–47)
HGB BLD-MCNC: 13.5 G/DL — SIGNIFICANT CHANGE UP (ref 12–16)
IMM GRANULOCYTES NFR BLD AUTO: 0.4 % — HIGH (ref 0.1–0.3)
LIDOCAIN IGE QN: 61 U/L — HIGH (ref 7–60)
LYMPHOCYTES # BLD AUTO: 1.23 K/UL — SIGNIFICANT CHANGE UP (ref 1.2–3.4)
LYMPHOCYTES # BLD AUTO: 23.1 % — SIGNIFICANT CHANGE UP (ref 20.5–51.1)
MAGNESIUM SERPL-MCNC: 2.2 MG/DL — SIGNIFICANT CHANGE UP (ref 1.8–2.4)
MCHC RBC-ENTMCNC: 28.4 PG — SIGNIFICANT CHANGE UP (ref 27–31)
MCHC RBC-ENTMCNC: 34.3 G/DL — SIGNIFICANT CHANGE UP (ref 32–37)
MCV RBC AUTO: 82.8 FL — SIGNIFICANT CHANGE UP (ref 81–99)
MONOCYTES # BLD AUTO: 0.58 K/UL — SIGNIFICANT CHANGE UP (ref 0.1–0.6)
MONOCYTES NFR BLD AUTO: 10.9 % — HIGH (ref 1.7–9.3)
NEUTROPHILS # BLD AUTO: 3.28 K/UL — SIGNIFICANT CHANGE UP (ref 1.4–6.5)
NEUTROPHILS NFR BLD AUTO: 61.5 % — SIGNIFICANT CHANGE UP (ref 42.2–75.2)
NRBC # BLD: 0 /100 WBCS — SIGNIFICANT CHANGE UP (ref 0–0)
NT-PROBNP SERPL-SCNC: 14 PG/ML — SIGNIFICANT CHANGE UP (ref 0–300)
PLATELET # BLD AUTO: 181 K/UL — SIGNIFICANT CHANGE UP (ref 130–400)
POTASSIUM SERPL-MCNC: 4.4 MMOL/L — SIGNIFICANT CHANGE UP (ref 3.5–5)
POTASSIUM SERPL-SCNC: 4.4 MMOL/L — SIGNIFICANT CHANGE UP (ref 3.5–5)
PROT SERPL-MCNC: 7 G/DL — SIGNIFICANT CHANGE UP (ref 6–8)
RBC # BLD: 4.76 M/UL — SIGNIFICANT CHANGE UP (ref 4.2–5.4)
RBC # FLD: 13.2 % — SIGNIFICANT CHANGE UP (ref 11.5–14.5)
SODIUM SERPL-SCNC: 142 MMOL/L — SIGNIFICANT CHANGE UP (ref 135–146)
TROPONIN T SERPL-MCNC: <0.01 NG/ML — SIGNIFICANT CHANGE UP
TROPONIN T SERPL-MCNC: <0.01 NG/ML — SIGNIFICANT CHANGE UP
WBC # BLD: 5.33 K/UL — SIGNIFICANT CHANGE UP (ref 4.8–10.8)
WBC # FLD AUTO: 5.33 K/UL — SIGNIFICANT CHANGE UP (ref 4.8–10.8)

## 2023-02-08 PROCEDURE — 84484 ASSAY OF TROPONIN QUANT: CPT

## 2023-02-08 PROCEDURE — 99285 EMERGENCY DEPT VISIT HI MDM: CPT

## 2023-02-08 PROCEDURE — 36415 COLL VENOUS BLD VENIPUNCTURE: CPT

## 2023-02-08 PROCEDURE — 83690 ASSAY OF LIPASE: CPT

## 2023-02-08 PROCEDURE — 71045 X-RAY EXAM CHEST 1 VIEW: CPT

## 2023-02-08 PROCEDURE — 83735 ASSAY OF MAGNESIUM: CPT

## 2023-02-08 PROCEDURE — 85025 COMPLETE CBC W/AUTO DIFF WBC: CPT

## 2023-02-08 PROCEDURE — 83880 ASSAY OF NATRIURETIC PEPTIDE: CPT

## 2023-02-08 PROCEDURE — 93005 ELECTROCARDIOGRAM TRACING: CPT

## 2023-02-08 PROCEDURE — 80053 COMPREHEN METABOLIC PANEL: CPT

## 2023-02-08 PROCEDURE — 93010 ELECTROCARDIOGRAM REPORT: CPT

## 2023-02-08 PROCEDURE — 71045 X-RAY EXAM CHEST 1 VIEW: CPT | Mod: 26

## 2023-02-08 PROCEDURE — 99285 EMERGENCY DEPT VISIT HI MDM: CPT | Mod: 25

## 2023-02-08 NOTE — ED PROVIDER NOTE - NSFOLLOWUPINSTRUCTIONS_ED_ALL_ED_FT
SEE YOU DOCTOR IN THE NEXT 24-48 HOURS   RETURN FOR ANY FURTHER CONCERNS     Chest Pain    Chest pain can be caused by many different conditions which may or may not be dangerous. Causes include heartburn, lung infections, heart attack, blood clot in lungs, skin infections, strain or damage to muscle, cartilage, or bones, etc. In addition to a history and physical examination, an electrocardiogram (ECG) or other lab tests may have been performed to determine the cause of your chest pain. Follow up with your primary care provider or with a cardiologist as instructed.     SEEK IMMEDIATE MEDICAL CARE IF YOU HAVE ANY OF THE FOLLOWING SYMPTOMS: worsening chest pain, coughing up blood, unexplained back/neck/jaw pain, severe abdominal pain, dizziness or lightheadedness, fainting, shortness of breath, sweaty or clammy skin, vomiting, or racing heart beat. These symptoms may represent a serious problem that is an emergency. Do not wait to see if the symptoms will go away. Get medical help right away. Call 911 and do not drive yourself to the hospital.

## 2023-02-08 NOTE — ED PROVIDER NOTE - OBJECTIVE STATEMENT
Pt is a 63y/o female here for eval of intermittent palpitations x 2 weeks, lasting for a minutes at a time, with no aggravating/alleviating factors. Pt denies fever, chills, weakness, numbness, SOB, leg swelling

## 2023-02-08 NOTE — ED PROVIDER NOTE - PATIENT PORTAL LINK FT
You can access the FollowMyHealth Patient Portal offered by [facility] by registering at the following website: [email or web address]. By joining [employer]’s FollowMyHealth portal, you will also be able to view your health information using other applications (apps) compatible with our system.

## 2023-02-08 NOTE — ED PROVIDER NOTE - ATTENDING APP SHARED VISIT CONTRIBUTION OF CARE
I have personally performed a history and physical exam on this patient and personally directed the management of the patient.  Patient is a 60-year-old female who presents for evaluation of palpitations as well as chest pressure that is moderate originating in late January however states that she became concerned over the past 48 hours denies any associated shortness of breath diaphoresis vomiting nausea abdominal pain back pain she has been able to increase her aerobic ability patient states that she was on the treadmill for approximately 45 minutes 1 day prior with no issues    On physical exam patient is well-appearing normocephalic atraumatic pupils equally round reactive 1 combination extraocular muscles intact oropharynx clear chest clear to auscultation bilaterally abdomen soft nontender nondistended bowel sounds positive radial pulse 2+ pedal pulse 2+ no focal deficits noted no edema noted    Assessment plan we obtained EKG per my independent evaluation and EKG no evidence of STEMI chest x-ray per my independent evaluation is negative we obtained troponin which is negative given that the patient is age we obtained a repeat set of troponin which is also negative patient remains asymptomatic and improved at this time instructed to follow-up in the next 24 hours with cardiology for further evaluation we discussed indications to return overall stressed a low threshold I have personally performed a history and physical exam on this patient and personally directed the management of the patient.  Patient is a 60-year-old female who presents for evaluation of palpitations as well as chest pressure that is moderate originating in late January however states that she became concerned over the past 48 hours denies any associated shortness of breath diaphoresis vomiting nausea abdominal pain back pain she has been able to increase her aerobic ability patient states that she was on the treadmill for approximately 45 minutes 1 day prior with no issues    On physical exam patient is well-appearing normocephalic atraumatic pupils equally round reactive 1 combination extraocular muscles intact oropharynx clear chest clear to auscultation bilaterally abdomen soft nontender nondistended bowel sounds positive radial pulse 2+ pedal pulse 2+ no focal deficits noted no edema noted    Assessment plan we obtained EKG per my independent evaluation and EKG no evidence of STEMI chest x-ray per my independent evaluation is negative we obtained troponin which is negative given that the patient is age we obtained a repeat set of troponin which is also negative patient remains asymptomatic and improved at this time instructed to follow-up in the next 24 hours with cardiology for further evaluation we discussed indications to return overall stressed a low threshold.

## 2023-02-08 NOTE — ED PROVIDER NOTE - NS ED MD DISPO DISCHARGE
Chronic HFrEF (heart failure with reduced ejection fraction) Chronic HFrEF (heart failure with reduced ejection fraction) Chronic HFrEF (heart failure with reduced ejection fraction) Chronic HFrEF (heart failure with reduced ejection fraction) Chronic HFrEF (heart failure with reduced ejection fraction) Chronic HFrEF (heart failure with reduced ejection fraction) Chronic HFrEF (heart failure with reduced ejection fraction) Chronic HFrEF (heart failure with reduced ejection fraction) Chronic HFrEF (heart failure with reduced ejection fraction) Home

## 2023-02-08 NOTE — ED PROVIDER NOTE - CLINICAL SUMMARY MEDICAL DECISION MAKING FREE TEXT BOX
we obtained EKG per my independent evaluation and EKG no evidence of STEMI chest x-ray per my independent evaluation is negative we obtained troponin which is negative given that the patient is age we obtained a repeat set of troponin which is also negative patient remains asymptomatic and improved at this time instructed to follow-up in the next 24 hours with cardiology for further evaluation we discussed indications to return overall stressed a low threshold